# Patient Record
(demographics unavailable — no encounter records)

---

## 2025-02-18 NOTE — ASSESSMENT
[FreeTextEntry1] : 56yoF average risk for CRC. No significant pmhx. Presents for CRC screening.     #Rectal bleeding, occasional with hard stool #CRC Screening -Bleeding likely 2/2 to hemorrhoids -Will schedule colonoscopy -Risks vs. benefits discussed -Educated on generic suprep and dulcolax Prep

## 2025-02-18 NOTE — PHYSICAL EXAM
[Alert] : alert [Normal Voice/Communication] : normal voice/communication [Healthy Appearing] : healthy appearing [No Acute Distress] : no acute distress [Sclera] : the sclera and conjunctiva were normal [Hearing Threshold Finger Rub Not [place]] : hearing was normal [Normal Lips/Gums] : the lips and gums were normal [Oropharynx] : the oropharynx was normal [Normal Appearance] : the appearance of the neck was normal [No Neck Mass] : no neck mass was observed [No Respiratory Distress] : no respiratory distress [No Acc Muscle Use] : no accessory muscle use [Respiration, Rhythm And Depth] : normal respiratory rhythm and effort [Auscultation Breath Sounds / Voice Sounds] : lungs were clear to auscultation bilaterally [Heart Rate And Rhythm] : heart rate was normal and rhythm regular [Normal S1, S2] : normal S1 and S2 [Murmurs] : no murmurs [Bowel Sounds] : normal bowel sounds [Abdomen Tenderness] : non-tender [No Masses] : no abdominal mass palpated [] : no hepatosplenomegaly [Abdomen Soft] : soft [Oriented To Time, Place, And Person] : oriented to person, place, and time

## 2025-02-18 NOTE — HISTORY OF PRESENT ILLNESS
[FreeTextEntry1] : 56yoF average risk for CRC. No significant pmhx. Presents for CRC screening.     Occasional constipation when traveling, takes a stool softener and then will have a bowel movement. Sees trace blood on tissue at times when straining.  Patient denies any abdominal pain, nausea, vomiting, dysphagia, heart burn, unintentional weight loss, diarrhea, constipation, melena, hematochezia.   Never had a colonoscopy before